# Patient Record
Sex: MALE | Race: BLACK OR AFRICAN AMERICAN | Employment: FULL TIME | URBAN - METROPOLITAN AREA
[De-identification: names, ages, dates, MRNs, and addresses within clinical notes are randomized per-mention and may not be internally consistent; named-entity substitution may affect disease eponyms.]

---

## 2021-05-16 ENCOUNTER — APPOINTMENT (OUTPATIENT)
Dept: GENERAL RADIOLOGY | Age: 71
End: 2021-05-16

## 2021-05-16 ENCOUNTER — HOSPITAL ENCOUNTER (EMERGENCY)
Age: 71
Discharge: HOME | End: 2021-05-16
Attending: STUDENT IN AN ORGANIZED HEALTH CARE EDUCATION/TRAINING PROGRAM

## 2021-05-16 VITALS
OXYGEN SATURATION: 99 % | SYSTOLIC BLOOD PRESSURE: 161 MMHG | DIASTOLIC BLOOD PRESSURE: 92 MMHG | RESPIRATION RATE: 12 BRPM | TEMPERATURE: 98.2 F | HEART RATE: 43 BPM
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
## 2021-05-16 DIAGNOSIS — R00.1 BRADYCARDIA: ICD-10-CM

## 2021-05-16 DIAGNOSIS — S46.911A STRAIN OF RIGHT SHOULDER, INITIAL ENCOUNTER: Primary | ICD-10-CM

## 2021-05-16 PROCEDURE — 73562 X-RAY EXAM OF KNEE 3: CPT

## 2021-05-16 PROCEDURE — 96372 THER/PROPH/DIAG INJ SC/IM: CPT

## 2021-05-16 PROCEDURE — 73130 X-RAY EXAM OF HAND: CPT

## 2021-05-16 PROCEDURE — 73030 X-RAY EXAM OF SHOULDER: CPT

## 2021-05-16 PROCEDURE — 6360000002 HC RX W HCPCS: Performed by: STUDENT IN AN ORGANIZED HEALTH CARE EDUCATION/TRAINING PROGRAM

## 2021-05-16 PROCEDURE — 99284 EMERGENCY DEPT VISIT MOD MDM: CPT

## 2021-05-16 PROCEDURE — 6370000000 HC RX 637 (ALT 250 FOR IP): Performed by: STUDENT IN AN ORGANIZED HEALTH CARE EDUCATION/TRAINING PROGRAM

## 2021-05-16 RX ORDER — ACETAMINOPHEN 325 MG/1
650 TABLET ORAL ONCE
Status: COMPLETED | OUTPATIENT
Start: 2021-05-16 | End: 2021-05-16

## 2021-05-16 RX ORDER — KETOROLAC TROMETHAMINE 30 MG/ML
15 INJECTION, SOLUTION INTRAMUSCULAR; INTRAVENOUS ONCE
Status: COMPLETED | OUTPATIENT
Start: 2021-05-16 | End: 2021-05-16

## 2021-05-16 RX ORDER — LIDOCAINE 4 G/G
1 PATCH TOPICAL ONCE
Status: DISCONTINUED | OUTPATIENT
Start: 2021-05-16 | End: 2021-05-16 | Stop reason: HOSPADM

## 2021-05-16 RX ADMIN — KETOROLAC TROMETHAMINE 15 MG: 30 INJECTION, SOLUTION INTRAMUSCULAR; INTRAVENOUS at 19:49

## 2021-05-16 RX ADMIN — ACETAMINOPHEN 650 MG: 325 TABLET ORAL at 19:50

## 2021-05-16 ASSESSMENT — PAIN DESCRIPTION - FREQUENCY
FREQUENCY: INTERMITTENT
FREQUENCY: CONTINUOUS

## 2021-05-16 ASSESSMENT — PAIN SCALES - GENERAL
PAINLEVEL_OUTOF10: 8
PAINLEVEL_OUTOF10: 8
PAINLEVEL_OUTOF10: 4

## 2021-05-16 ASSESSMENT — ENCOUNTER SYMPTOMS
VOMITING: 0
EYE REDNESS: 0
COUGH: 0
NAUSEA: 0
SHORTNESS OF BREATH: 0
BACK PAIN: 1
COLOR CHANGE: 0
EYE DISCHARGE: 0
ABDOMINAL PAIN: 0
RHINORRHEA: 0
SORE THROAT: 0

## 2021-05-16 ASSESSMENT — PAIN DESCRIPTION - LOCATION: LOCATION: LEG;SHOULDER

## 2021-05-16 ASSESSMENT — PAIN DESCRIPTION - ONSET: ONSET: SUDDEN

## 2021-05-16 ASSESSMENT — PAIN DESCRIPTION - PAIN TYPE: TYPE: ACUTE PAIN

## 2021-05-16 NOTE — ED PROVIDER NOTES
[facility]          ATTENDING PHYSICIAN NOTE       Date of evaluation: 5/16/2021    Chief Complaint     Fall (Pt states he tripped and fell today injurying his left and right shoulder, left and right knee leg, and back. fell down 3 steps onto carpeted floor), Shoulder Pain, and Leg Pain      History of Present Illness     Marizol Myers is a 73 y.o. male who presents to the ED for evaluation after a fall. Patient was moonlighting as a  and slipped on the stairs while carrying a bag of trash at 0300 this morning. He fell down a couple of steps, landing on his right side. He denies hitting his head or losing consciousness. He complains of bilateral shoulder and knee pain, worse on the right side. Also had pain in his right hand and right lower back. Denies chest pain, sob, abd pain, headache, neck pain, numbness, tingling, or weakness. Review of Systems     Review of Systems   Constitutional: Negative for chills and fever. HENT: Negative for rhinorrhea and sore throat. Eyes: Negative for discharge and redness. Respiratory: Negative for cough and shortness of breath. Cardiovascular: Negative for chest pain and leg swelling. Gastrointestinal: Negative for abdominal pain, nausea and vomiting. Genitourinary: Negative for dysuria and hematuria. Musculoskeletal: Positive for arthralgias, back pain and myalgias. Skin: Negative for color change and rash. Neurological: Negative for light-headedness and headaches. Psychiatric/Behavioral: Negative for agitation and confusion. Past Medical, Surgical, Family, and Social History     He has no past medical history on file. He has a past surgical history that includes hernia repair; Appendectomy; Neck surgery; and Wrist surgery (Left). His family history is not on file. He reports that he has been smoking cigarettes. He has been smoking about 1.00 pack per day.  He has never used smokeless tobacco. He reports current alcohol use. He reports that he does not use drugs. Medications     There are no discharge medications for this patient. Allergies     He has No Known Allergies. Physical Exam     INITIAL VITALS: BP: (!) 153/93, Temp: 98.2 °F (36.8 °C), Pulse: 52, Resp: 18, SpO2: 100 %   Physical Exam  Constitutional:       Appearance: Normal appearance. HENT:      Head: Normocephalic and atraumatic. Right Ear: External ear normal.      Left Ear: External ear normal.   Eyes:      Extraocular Movements: Extraocular movements intact. Pupils: Pupils are equal, round, and reactive to light. Cardiovascular:      Rate and Rhythm: Normal rate. Pulses:           Radial pulses are 2+ on the right side and 2+ on the left side. Pulmonary:      Effort: Pulmonary effort is normal. No respiratory distress. Abdominal:      Palpations: Abdomen is soft. Tenderness: There is no abdominal tenderness. Musculoskeletal:         General: No swelling or deformity. Right shoulder: Tenderness present. No deformity or crepitus. Left shoulder: No deformity or crepitus. Cervical back: Normal range of motion and neck supple. Comments: ROM intact in bilateral shoulders, limited on right 2/2 pain.  strength 5/5 bilaterally. No sensory deficit. Right lumbar paraspinal tenderness. No midline tenderness. Neurological:      General: No focal deficit present. Mental Status: He is alert and oriented to person, place, and time. GCS: GCS eye subscore is 4. GCS verbal subscore is 5. GCS motor subscore is 6. Sensory: Sensation is intact. Motor: Motor function is intact. Gait: Gait normal.   Psychiatric:         Mood and Affect: Mood normal.         Behavior: Behavior normal.         Diagnostic Results     RADIOLOGY:  XR HAND RIGHT (MIN 3 VIEWS)   Final Result   Impression:    No acute osseous injury.          XR SHOULDER RIGHT (MIN 2 VIEWS)   Final Result   Impression:    No acute osseous injury or dislocation. XR SHOULDER LEFT (MIN 2 VIEWS)   Final Result   Impression:    No acute osseous injury or dislocation. Suspected 4 mm chronic intra-articular loose body. XR KNEE RIGHT (3 VIEWS)   Final Result   Impression:    No acute osseous injury. XR KNEE LEFT (3 VIEWS)   Final Result   Impression:    No acute osseous injury. LABS:   No results found for this visit on 05/16/21. RECENT VITALS:  BP: (!) 161/92,Temp: 98.2 °F (36.8 °C), Pulse: (!) 43, Resp: 12, SpO2: 99 %       ED Course     Nursing Notes, Past Medical Hx, Past Surgical Hx, Social Hx,Allergies, and Family Hx were reviewed. patient was given the following medications:  Orders Placed This Encounter   Medications    lidocaine 4 % external patch 1 patch    acetaminophen (TYLENOL) tablet 650 mg    ketorolac (TORADOL) injection 15 mg       CONSULTS:  None    MEDICAL DECISIONMAKING / ASSESSMENT / PLAN     Muna Nunes is a 73 y.o. male presents for evaluation after a fall. Patient tripped on the stairs and fell. He denies preceding symptoms or syncope. No head trauma or LOC. C spine clear per NEXUS. No midline back pain. BUE/BLE strength 5/5. No incontinence or paraesthesias. Xrays of bilateral shoulders, bilateral knees, and right hand were negative for fracture or dislocation. His pain improved with tylenol, toradol, and lidoderm patch. While in the ED, he was noted to be bradycardic in the mid 40s. Cardiac monitor showed him to be in sinus rhythm. Patient denies any chest pain, shortness of breath, lightheadedness, or nausea. I feel that he is stable for outpatient follow up. He was given contact information for the [facility] and encouraged to make an appointment. ED precautions for worsening symptoms. Clinical Impression     1. Strain of right shoulder, initial encounter    2. Fall, initial encounter    3. Bradycardia        Disposition     PATIENT REFERRED TO:  [address] [place] [address]  [phone]    Schedule an appointment as soon as possible for a visit       [facility]  [address] [place] [address]  [address]  [phone]    If symptoms worsen      DISCHARGE MEDICATIONS:  There are no discharge medications for this patient.       DISPOSITION Decision To Discharge 05/16/2021 08:26:51 PM       Ayden Vu MD  05/16/21 [id number]

## 2021-05-17 NOTE — ED NOTES
Patient walked down lopez, gait steady, denies dizziness or unsteady gait. Plan discharge home and follow up with [facility] of choice or [address] [place] [address], or obtain PCP for follow up.      Amber Whittington RN  05/16/21 2051

## 2021-05-17 NOTE — ED NOTES
Patient heart rate ranging from 44-54 bpm, placed on tele while Dr. Chelsey Keith at bedside, rhythm sinus ira no ectopy noted. Patient denies any distress.  Continue to monitor      Joycelyn Pham RN  05/16/21 5947